# Patient Record
Sex: FEMALE | URBAN - METROPOLITAN AREA
[De-identification: names, ages, dates, MRNs, and addresses within clinical notes are randomized per-mention and may not be internally consistent; named-entity substitution may affect disease eponyms.]

---

## 2018-02-01 NOTE — PATIENT DISCUSSION
(H25.13) Age-related nuclear cataract, bilateral - Assesment : Examination revealed cataract with cortical flecks OU. Mild symptoms. - Plan : Monitor for changes. Advised patient to call our office with decreased vision or increased symptoms. Updated GLRx given today. RTC in 1-2 years for Exam, sooner if problems or changes.

## 2018-02-01 NOTE — PATIENT DISCUSSION
(O85.527) Keratoconjunct sicca, not specified as Sjogren's, bilateral - Assesment : Examination revealed Dry Eye Syndrome - Plan : Monitor for changes. ATs 2-3 times per day. Explained [place]ER[place]T symptoms.

## 2019-05-13 NOTE — PATIENT DISCUSSION
(H25.13) Age-related nuclear cataract, bilateral - Assesment : Examination revealed cataract with cortical flecks OU. Mild OU. - Plan : Monitor for changes. Advised patient of condition of cataracts. Updated GLRx given today. Pt to call our office with decreased vision or increased symptoms. RTC in 1-2 years for Exam, sooner if problems or changes.

## 2021-01-12 ENCOUNTER — IMPORTED ENCOUNTER (OUTPATIENT)
Dept: URBAN - METROPOLITAN AREA CLINIC 9 | Facility: CLINIC | Age: 71
End: 2021-01-12

## 2021-02-17 NOTE — PATIENT DISCUSSION
Updated GLRx given today. Recommended good quality progressive when updating GLRx as Pt had difficulty with trying progressives previously.

## 2021-02-17 NOTE — PATIENT DISCUSSION
Monitor for changes. Advised Pt of condition of cataracts and explained congenital cataracts. Discussed option of CE to improve visual function once becomes more bothersome. Discussed symptoms of worsening and becoming more bothersome. Pt to call if vision changes or increased symptoms.

## 2021-10-16 ASSESSMENT — VISUAL ACUITY
OS_CC: 20/20 - SN
OD_CC: 20/25 - SN

## 2021-10-16 ASSESSMENT — TONOMETRY
OD_IOP_MMHG: 9
OS_IOP_MMHG: 10